# Patient Record
Sex: MALE | Race: WHITE | NOT HISPANIC OR LATINO | Employment: FULL TIME | URBAN - METROPOLITAN AREA
[De-identification: names, ages, dates, MRNs, and addresses within clinical notes are randomized per-mention and may not be internally consistent; named-entity substitution may affect disease eponyms.]

---

## 2022-10-08 ENCOUNTER — APPOINTMENT (OUTPATIENT)
Dept: RADIOLOGY | Facility: HOSPITAL | Age: 57
End: 2022-10-08
Payer: COMMERCIAL

## 2022-10-08 ENCOUNTER — HOSPITAL ENCOUNTER (EMERGENCY)
Facility: HOSPITAL | Age: 57
Discharge: HOME | End: 2022-10-08
Attending: EMERGENCY MEDICINE
Payer: COMMERCIAL

## 2022-10-08 VITALS
RESPIRATION RATE: 16 BRPM | HEART RATE: 70 BPM | SYSTOLIC BLOOD PRESSURE: 133 MMHG | DIASTOLIC BLOOD PRESSURE: 85 MMHG | TEMPERATURE: 97.9 F | OXYGEN SATURATION: 94 % | WEIGHT: 238.1 LBS

## 2022-10-08 DIAGNOSIS — M77.8 LEFT WRIST TENDINITIS: Primary | ICD-10-CM

## 2022-10-08 PROCEDURE — 99285 EMERGENCY DEPT VISIT HI MDM: CPT | Performed by: PHYSICIAN ASSISTANT

## 2022-10-08 PROCEDURE — 73110 X-RAY EXAM OF WRIST: CPT

## 2022-10-08 PROCEDURE — 99283 EMERGENCY DEPT VISIT LOW MDM: CPT

## 2022-10-08 RX ORDER — OXYCODONE HYDROCHLORIDE AND ACETAMINOPHEN 5; 325 MG/1; MG/1
1 TABLET ORAL ONCE
Status: COMPLETED | OUTPATIENT
Start: 2022-10-08 | End: 2022-10-08

## 2022-10-08 RX ORDER — IBUPROFEN 600 MG/1
600 TABLET ORAL ONCE
Status: COMPLETED | OUTPATIENT
Start: 2022-10-08 | End: 2022-10-08

## 2022-10-08 RX ORDER — ACETAMINOPHEN 325 MG/1
650 TABLET ORAL EVERY 6 HOURS
Qty: 30 TABLET | Refills: 0 | Status: SHIPPED | OUTPATIENT
Start: 2022-10-08

## 2022-10-08 RX ORDER — PREDNISONE 20 MG/1
40 TABLET ORAL ONCE
Status: COMPLETED | OUTPATIENT
Start: 2022-10-08 | End: 2022-10-08

## 2022-10-08 RX ORDER — PREDNISONE 20 MG/1
40 TABLET ORAL DAILY
Qty: 4 TABLET | Refills: 0 | Status: SHIPPED | OUTPATIENT
Start: 2022-10-08 | End: 2022-10-10

## 2022-10-08 RX ADMIN — PREDNISONE 40 MG: 20 TABLET ORAL at 22:42

## 2022-10-08 RX ADMIN — OXYCODONE HYDROCHLORIDE AND ACETAMINOPHEN 1 TABLET: 5; 325 TABLET ORAL at 22:43

## 2022-10-08 RX ADMIN — IBUPROFEN 600 MG: 600 TABLET ORAL at 21:26

## 2022-10-08 RX ADMIN — DICLOFENAC SODIUM TOPICAL GEL, 1%, 2 G: 10 GEL TOPICAL at 21:29

## 2022-10-09 NOTE — DISCHARGE INSTRUCTIONS
Take prescribed medications as directed  Follow up with orthopedist  Limit use of left wrist  Ice to the area  Elevate to help with swelling  Purchase wrist splint from pharmacy  Return to the ER if anything acutely changes

## 2022-10-09 NOTE — ED PROVIDER NOTES
History  Chief Complaint   Patient presents with   • Wrist Pain     Patient presents to ED with L wrist pain that started yesterday evening  States no traumatic injury to the site  Denies taking medication for pain     Patient is a 65 YOM presenting to the ER for evaluation  He is complaining of left wrist pain  He states he feels like his "tendons hurt " He states he has a history of tendonitis in the past and this feels similar  He states it began hurting last evening and worsened tonight  He states the pain is the worse when he is moving the wrist and steering his truck  Patient applied bengay to the area without much relief  He denies any fever, chills, chest, shortness of breath, or any other symptoms at this time  History provided by:  Patient   used: No        None       History reviewed  No pertinent past medical history  History reviewed  No pertinent surgical history  History reviewed  No pertinent family history  I have reviewed and agree with the history as documented  E-Cigarette/Vaping     E-Cigarette/Vaping Substances     Social History     Tobacco Use   • Smoking status: Current Every Day Smoker     Packs/day: 1 50     Types: Cigarettes   • Smokeless tobacco: Never Used   Substance Use Topics   • Alcohol use: Not Currently   • Drug use: Not Currently       Review of Systems   Constitutional: Negative for chills and fever  HENT: Negative for ear pain and sore throat  Eyes: Negative for pain and visual disturbance  Respiratory: Negative for cough and shortness of breath  Cardiovascular: Negative for chest pain and palpitations  Gastrointestinal: Negative for abdominal pain and vomiting  Genitourinary: Negative for dysuria and hematuria  Musculoskeletal: Positive for arthralgias (left wrist pain)  Negative for back pain  Skin: Negative for color change and rash  Neurological: Negative for seizures and syncope     All other systems reviewed and are negative  Physical Exam  Physical Exam  Vitals and nursing note reviewed  Constitutional:       Appearance: He is well-developed  HENT:      Head: Normocephalic and atraumatic  Eyes:      Conjunctiva/sclera: Conjunctivae normal    Cardiovascular:      Rate and Rhythm: Normal rate and regular rhythm  Heart sounds: No murmur heard  Pulmonary:      Effort: Pulmonary effort is normal  No respiratory distress  Breath sounds: Normal breath sounds  Abdominal:      Palpations: Abdomen is soft  Tenderness: There is no abdominal tenderness  Musculoskeletal:         General: Tenderness present  Cervical back: Neck supple  Comments: No obvious deformity  No redness or swelling noted  Tenderness noted to the flexor tendons of the wrist  Limited ROM due to pain  5/5 strength  5/5 sensation  No scaphoid tenderness     Skin:     General: Skin is warm and dry  Neurological:      Mental Status: He is alert           Vital Signs  ED Triage Vitals [10/08/22 2046]   Temperature Pulse Respirations Blood Pressure SpO2   97 9 °F (36 6 °C) 92 18 (!) 175/112 97 %      Temp Source Heart Rate Source Patient Position - Orthostatic VS BP Location FiO2 (%)   Oral Monitor Sitting Right arm --      Pain Score       10 - Worst Possible Pain           Vitals:    10/08/22 2046 10/08/22 2200   BP: (!) 175/112 133/85   Pulse: 92 70   Patient Position - Orthostatic VS: Sitting          ED Medications  Medications   Diclofenac Sodium (VOLTAREN) 1 % topical gel 2 g (2 g Topical Given 10/8/22 2129)   ibuprofen (MOTRIN) tablet 600 mg (600 mg Oral Given 10/8/22 2126)   predniSONE tablet 40 mg (40 mg Oral Given 10/8/22 2242)   oxyCODONE-acetaminophen (PERCOCET) 5-325 mg per tablet 1 tablet (1 tablet Oral Given 10/8/22 2243)       Diagnostic Studies  Results Reviewed     None                 XR wrist 3+ views LEFT   ED Interpretation by Rosetta Hernandez PA-C (10/08 2120)   No acute findings MDM  Number of Diagnoses or Management Options  Left wrist tendinitis: new and requires workup  Diagnosis management comments: Patient presenting with 65 YOM presenting with left wrist pain  Patient is a  and has a history of tendonitis, states this feels similar  Pain worse with movement  On exam he has tenderness to the flexor tendons of the left wrist  Patient provided with anti inflammatory regimen and advised to purchase a splint at the drug store  Patient advised to return to the ER if anything acutely changes  Amount and/or Complexity of Data Reviewed  Tests in the radiology section of CPT®: ordered and reviewed    Patient Progress  Patient progress: stable      Disposition  Final diagnoses:   Left wrist tendinitis     Time reflects when diagnosis was documented in both MDM as applicable and the Disposition within this note     Time User Action Codes Description Comment    10/8/2022 10:28 PM Jesusita Gordon Gigi [M77 [id number]] Left wrist tendinitis       ED Disposition     ED Disposition   Discharge    Condition   Stable    Date/Time   Sat Oct 8, 2022 10:32 PM    Comment   Mario Lawson discharge to home/self care  Follow-up Information    None         Discharge Medication List as of 10/8/2022 10:35 PM      START taking these medications    Details   acetaminophen (TYLENOL) 325 mg tablet Take 2 tablets (650 mg total) by mouth every 6 (six) hours, Starting Sat 10/8/2022, Print      ibuprofen (ADVIL,MOTRIN) 100 MG tablet Take 2 tablets (200 mg total) by mouth every 6 (six) hours as needed for mild pain, Starting Sat 10/8/2022, Until Mon 11/7/2022 at 2359, Print      predniSONE 20 mg tablet Take 2 tablets (40 mg total) by mouth daily for 2 days, Starting Sat 10/8/2022, Until Mon 10/10/2022, Print             No discharge procedures on file      PDMP Review     None          ED Provider  Electronically Signed by           Kait Brock PA-C  10/08/22 9822